# Patient Record
Sex: FEMALE | Race: WHITE
[De-identification: names, ages, dates, MRNs, and addresses within clinical notes are randomized per-mention and may not be internally consistent; named-entity substitution may affect disease eponyms.]

---

## 2019-12-05 ENCOUNTER — HOSPITAL ENCOUNTER (OUTPATIENT)
Dept: HOSPITAL 35 - ULTRA | Age: 69
End: 2019-12-05
Attending: FAMILY MEDICINE
Payer: COMMERCIAL

## 2019-12-05 DIAGNOSIS — I10: ICD-10-CM

## 2019-12-05 DIAGNOSIS — K76.0: Primary | ICD-10-CM

## 2019-12-10 ENCOUNTER — HOSPITAL ENCOUNTER (OUTPATIENT)
Dept: HOSPITAL 35 - RAD | Age: 69
End: 2019-12-10
Attending: FAMILY MEDICINE
Payer: COMMERCIAL

## 2019-12-10 DIAGNOSIS — N83.291: Primary | ICD-10-CM

## 2019-12-24 ENCOUNTER — HOSPITAL ENCOUNTER (EMERGENCY)
Dept: HOSPITAL 35 - ER | Age: 69
Discharge: HOME | End: 2019-12-24
Payer: COMMERCIAL

## 2019-12-24 VITALS — WEIGHT: 220 LBS | HEIGHT: 66 IN | BODY MASS INDEX: 35.36 KG/M2

## 2019-12-24 VITALS — DIASTOLIC BLOOD PRESSURE: 75 MMHG | SYSTOLIC BLOOD PRESSURE: 174 MMHG

## 2019-12-24 DIAGNOSIS — F41.9: ICD-10-CM

## 2019-12-24 DIAGNOSIS — Z90.49: ICD-10-CM

## 2019-12-24 DIAGNOSIS — K21.9: ICD-10-CM

## 2019-12-24 DIAGNOSIS — Z79.4: ICD-10-CM

## 2019-12-24 DIAGNOSIS — E11.9: ICD-10-CM

## 2019-12-24 DIAGNOSIS — I10: ICD-10-CM

## 2019-12-24 DIAGNOSIS — M43.6: Primary | ICD-10-CM

## 2019-12-27 NOTE — EKG
[place] [address] [facility]
[place]  [address]
Phone:  [phone]                    ELECTROCARDIOGRAM REPORT      
_______________________________________________________________________________
 
Name:       NATALIE GÓMEZ       [facility]                     [id number]     Account #:      [id number]  
Admission:  19    Attend Phys:                          
Discharge:  19    Date of Birth:  [birth year]  
                                                          Report #: [id number]
   [id number]
_______________________________________________________________________________
THIS REPORT FOR:   //name//                          
 
                         [facility] ED
                                       
Test Date:    2019               Test Time:    08:31:47
Pat Name:     NATALIE GÓMEZ          Department:   
Patient ID:   [id number]            Room:          
Gender:       F                        Technician:   mayuri
:          1950               Requested By: Gonzalo Guzman
Order Number: [id number] MD:   Klever De Santiago
                                 Measurements
Intervals                              Axis          
Rate:         88                       P:            -33
AR:           157                      QRS:          -11
QRSD:         93                       T:            19
QT:           360                                    
QTc:          436                                    
                           Interpretive Statements
Sinus rhythm
Atrial premature complexes in couplets
Probable left ventricular hypertrophy
Inferior infarct, old
Anterior Q waves, possibly due to LVH
Lateral leads are also involved
Compared to ECG 10/19/2017 07:23:25
Left ventricular hypertrophy now present
Q waves now present
Poor R-wave progression no longer present
Myocardial infarct finding still present
 
Electronically Signed On 2019 14:52:44 CST by Klever De Santiago
[email or web address]
 
 
 
 
 
 
 
 
 
 
 
  <ELECTRONICALLY SIGNED>
   By: Klever De Santiago MD        
  19     1452
D: 19                           _____________________________________
T: 19                           Klever De Santiago MD          /EPI

## 2020-05-11 ENCOUNTER — HOSPITAL ENCOUNTER (OUTPATIENT)
Dept: HOSPITAL 35 - CAT | Age: 70
End: 2020-05-11
Attending: FAMILY MEDICINE
Payer: COMMERCIAL

## 2020-05-11 DIAGNOSIS — Z13.6: Primary | ICD-10-CM

## 2020-06-03 ENCOUNTER — HOSPITAL ENCOUNTER (OUTPATIENT)
Dept: HOSPITAL 35 - SJCVCIMAG | Age: 70
End: 2020-06-03
Attending: INTERNAL MEDICINE
Payer: COMMERCIAL

## 2020-06-03 DIAGNOSIS — R93.1: ICD-10-CM

## 2020-06-03 DIAGNOSIS — I08.2: Primary | ICD-10-CM

## 2020-06-03 DIAGNOSIS — E11.9: ICD-10-CM

## 2020-06-03 DIAGNOSIS — E78.5: ICD-10-CM

## 2020-06-03 DIAGNOSIS — Z79.84: ICD-10-CM

## 2020-06-03 DIAGNOSIS — Z79.899: ICD-10-CM

## 2020-06-03 DIAGNOSIS — I11.9: ICD-10-CM

## 2020-06-03 DIAGNOSIS — R00.0: ICD-10-CM

## 2020-06-22 ENCOUNTER — HOSPITAL ENCOUNTER (OUTPATIENT)
Dept: HOSPITAL 35 - LAB | Age: 70
End: 2020-06-22
Attending: INTERNAL MEDICINE
Payer: COMMERCIAL

## 2020-06-22 DIAGNOSIS — Z11.59: ICD-10-CM

## 2020-06-22 DIAGNOSIS — Z01.818: Primary | ICD-10-CM

## 2020-06-25 ENCOUNTER — HOSPITAL ENCOUNTER (OUTPATIENT)
Dept: HOSPITAL 35 - CATH | Age: 70
Discharge: HOME | End: 2020-06-25
Attending: INTERNAL MEDICINE
Payer: COMMERCIAL

## 2020-06-25 VITALS — SYSTOLIC BLOOD PRESSURE: 175 MMHG | DIASTOLIC BLOOD PRESSURE: 78 MMHG

## 2020-06-25 VITALS — BODY MASS INDEX: 36.16 KG/M2 | WEIGHT: 225 LBS | HEIGHT: 66 IN

## 2020-06-25 DIAGNOSIS — K21.9: ICD-10-CM

## 2020-06-25 DIAGNOSIS — R07.9: Primary | ICD-10-CM

## 2020-06-25 DIAGNOSIS — E11.9: ICD-10-CM

## 2020-06-25 DIAGNOSIS — I25.10: ICD-10-CM

## 2020-06-25 DIAGNOSIS — Z98.890: ICD-10-CM

## 2020-06-25 DIAGNOSIS — I10: ICD-10-CM

## 2020-06-25 DIAGNOSIS — Z79.899: ICD-10-CM

## 2020-06-25 DIAGNOSIS — Z79.4: ICD-10-CM

## 2020-06-25 DIAGNOSIS — Z85.820: ICD-10-CM

## 2020-06-25 DIAGNOSIS — E78.5: ICD-10-CM

## 2020-06-25 LAB
ANION GAP SERPL CALC-SCNC: 7 MMOL/L (ref 7–16)
BUN SERPL-MCNC: 24 MG/DL (ref 7–18)
CALCIUM SERPL-MCNC: 8.5 MG/DL (ref 8.5–10.1)
CHLORIDE SERPL-SCNC: 104 MMOL/L (ref 98–107)
CO2 SERPL-SCNC: 27 MMOL/L (ref 21–32)
CREAT SERPL-MCNC: 1.1 MG/DL (ref 0.6–1)
ERYTHROCYTE [DISTWIDTH] IN BLOOD BY AUTOMATED COUNT: 13.6 % (ref 10.5–14.5)
GLUCOSE SERPL-MCNC: 178 MG/DL (ref 74–106)
HCT VFR BLD CALC: 38.4 % (ref 37–47)
HGB BLD-MCNC: 13 GM/DL (ref 12–15)
MCH RBC QN AUTO: 29 PG (ref 26–34)
MCHC RBC AUTO-ENTMCNC: 33.7 G/DL (ref 28–37)
MCV RBC: 85.9 FL (ref 80–100)
PLATELET # BLD: 159 THOU/UL (ref 150–400)
POTASSIUM SERPL-SCNC: 3.9 MMOL/L (ref 3.5–5.1)
RBC # BLD AUTO: 4.47 MIL/UL (ref 4.2–5)
SODIUM SERPL-SCNC: 138 MMOL/L (ref 136–145)
WBC # BLD AUTO: 5.9 THOU/UL (ref 4–11)

## 2020-06-25 NOTE — CATHLAB
[facility]
[address]
[place]   [address]                   INVASIVE PROCEDURE REPORT     
_______________________________________________________________________________
 
Name:       NATALIE GÓMEZ       Room #:                     REG DEMETRIO 
[facility]#:      [id number]                       Account #:      [id number]  
Admission:  06/25/20    Attend Phys:    Freddy Medina MD,
Discharge:              Date of Birth:  [birth year]  
                                                          Report #: [id number]
                                                                    [id number]
_______________________________________________________________________________
THIS REPORT FOR:  
 
cc:  Kavon Garcia,Freddy Jernigan MD [facility]                                        
                                                                       ~
 
--------------- APPROVED REPORT --------------
 
 
Study performed:  06/25/2020 07:40:49
 
Patient Details
The patient is a 69 year-old female
 
Event Personnel
Freddy Medina  Cardiologist, Cora Rodrigues RTR, Robby Red Sherra RTR Monitor, Angi Dai RTR Monitor, Joseph Florence  
RN, Boyd Patterson RN RN
 
Procedures Performed
Art Access - R femoral artery*  Left Heart Cath w/or w/o Coronaries 
[id number] [facility] [address] Initial Mod Sed Same Phys/QHP Gr5y [id number] 66216 Mod 
Sed Same Phys/QHP Ea [id number] Hemostasis w/ Mynx
 
Indication
Chest pain
 
Procedure Narrative
The Right Groin 20ML^ was infiltrated with subcutaneous anesthesia. 
The right femoral accessed via ultrasound guidance.  A PINNACLE 6FR 
Sheath #[id number] sheath was inserted into the RIGHT FEMORAL ARTERY. 
Coronary angiography was performed using coronary diagnostic 
catheters. The right coronary system was accessed and visualized with 
a 6FR JR 4 #[id number] catheter. The left coronary system was accessed 
and visualized with a 6FR JL4 #[id number] catheter. The left ventricle 
was accessed and visualized with a 6FR PIGTAIL 145 ANGLED #[id number] 
catheter. Left ventriculogram was performed in 30 degree projection. 
An aortogram of the abdominal aorta was performed. Closure device was 
deployed with a Fr MYNXGRIP 6/7F #[id number]. The patient tolerated the 
procedure well and there were no complications associated with the 
procedure. There was no hematoma.
 
Intraoperative Conscious Sedation
Sedation start time:  8:21           Case end Time:  
8:49    
 
 
[address]  [address]
Phone:  [phone]                    INVASIVE PROCEDURE REPORT     
_______________________________________________________________________________
 
Name:            NATALIE GÓMEZ LUX       [facility]                    [id number]          Account #:     [id number]  
Admission:       06/25/20         Attend Phys:   Freddy Medina,
Discharge:                  Date of Birth: [birth year]  
                         Report #:      [id number]
        [id number]
_______________________________________________________________________________
Fentanyl  100 mcg    Versed  2 mg  
 
Fluoro Time:    2.00 minutes    
Dose:     DAP 6317.00 cGycm2  866 mGy  
Contrast Type and Amount:  Visipaque 115 ml   
 
Coronary Angiography
The patient's coronary anatomy is left dominant. 
 
Diagnostic Cath
Left Main Normal left main
LAD  Normal left anterior descending
Diagonal 1 Small, single diagonal branch, angiographically 
normal
Circumflex Large, nondominant circumflex
OM1  Large single marginal branch, angiographically normal
Right Coronary Dominant right coronary, angiographically normal
R PDA  Normal small posterior descending
Ramus  Minimal plaquing at the origin of a large ramus branch
 
Left Ventriculography
The left ventricle is normal in size with normal contractility. The 
left ventricular ejection fraction is estimated to be 60-65%. Left 
ventricular wall motion abnormalities are not present. There is no 
mitral insufficiency.
 
Hemodynamics
The aortic pressure is 192/75 mmHg with a mean of 123 mmHg. The left 
ventricular pressure is 201/16 mmHg with a mean of mmHg. The left 
ventricular end diastolic pressure is 32 mmHg. 
 
Conclusion
1.  Normal global and regional left ventricular systolic function.  
Ejection fraction 65%.
2.  Normal left main
3.  Normal coronary vasculature.  Right coronary dominant 
circulation.
 
 
 
 
 
 
 
  <ELECTRONICALLY SIGNED>
   By: Freddy Medina MD, FACC   
  06/25/20 0915
D: 06/25/20 0915                           _____________________________________
T: 06/25/20 0915                           Freddy Medina MD, FACC     /INF

## 2020-11-16 ENCOUNTER — HOSPITAL ENCOUNTER (OUTPATIENT)
Dept: HOSPITAL 35 - BC | Age: 70
End: 2020-11-16
Attending: OBSTETRICS & GYNECOLOGY
Payer: COMMERCIAL

## 2020-11-16 DIAGNOSIS — Z12.31: Primary | ICD-10-CM

## 2021-12-22 ENCOUNTER — HOSPITAL ENCOUNTER (OUTPATIENT)
Dept: HOSPITAL 35 - RAD | Age: 71
End: 2021-12-22
Attending: FAMILY MEDICINE
Payer: COMMERCIAL

## 2021-12-22 DIAGNOSIS — N64.89: ICD-10-CM

## 2021-12-22 DIAGNOSIS — Z12.31: Primary | ICD-10-CM
